# Patient Record
Sex: FEMALE | Race: ASIAN | Employment: STUDENT | URBAN - METROPOLITAN AREA
[De-identification: names, ages, dates, MRNs, and addresses within clinical notes are randomized per-mention and may not be internally consistent; named-entity substitution may affect disease eponyms.]

---
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
## 2018-11-29 ENCOUNTER — OFFICE VISIT (OUTPATIENT)
Dept: FAMILY MEDICINE | Facility: CLINIC | Age: 18
End: 2018-11-29
Payer: COMMERCIAL

## 2018-11-29 VITALS — TEMPERATURE: 99.1 F | DIASTOLIC BLOOD PRESSURE: 54 MMHG | SYSTOLIC BLOOD PRESSURE: 112 MMHG

## 2018-11-29 DIAGNOSIS — Z71.84 TRAVEL ADVICE ENCOUNTER: Primary | ICD-10-CM

## 2018-11-29 DIAGNOSIS — Z23 NEED FOR VACCINATION: ICD-10-CM

## 2018-11-29 PROCEDURE — 90472 IMMUNIZATION ADMIN EACH ADD: CPT | Mod: GA | Performed by: NURSE PRACTITIONER

## 2018-11-29 PROCEDURE — 90675 RABIES VACCINE IM: CPT | Mod: GA | Performed by: NURSE PRACTITIONER

## 2018-11-29 PROCEDURE — 90717 YELLOW FEVER VACCINE SUBQ: CPT | Mod: GA | Performed by: NURSE PRACTITIONER

## 2018-11-29 PROCEDURE — 99403 PREV MED CNSL INDIV APPRX 45: CPT | Mod: 25 | Performed by: NURSE PRACTITIONER

## 2018-11-29 PROCEDURE — 90715 TDAP VACCINE 7 YRS/> IM: CPT | Mod: GA | Performed by: NURSE PRACTITIONER

## 2018-11-29 PROCEDURE — 90471 IMMUNIZATION ADMIN: CPT | Mod: GA | Performed by: NURSE PRACTITIONER

## 2018-11-29 RX ORDER — ACETAZOLAMIDE 125 MG/1
TABLET ORAL
Qty: 15 TABLET | Refills: 0 | Status: SHIPPED | OUTPATIENT
Start: 2018-11-29 | End: 2019-08-10

## 2018-11-29 RX ORDER — AZITHROMYCIN 500 MG/1
500 TABLET, FILM COATED ORAL DAILY
Qty: 3 TABLET | Refills: 0 | Status: SHIPPED | OUTPATIENT
Start: 2018-11-29 | End: 2018-12-02

## 2018-11-29 ASSESSMENT — ANXIETY QUESTIONNAIRES
GAD7 TOTAL SCORE: 2
3. WORRYING TOO MUCH ABOUT DIFFERENT THINGS: NOT AT ALL
1. FEELING NERVOUS, ANXIOUS, OR ON EDGE: SEVERAL DAYS
IF YOU CHECKED OFF ANY PROBLEMS ON THIS QUESTIONNAIRE, HOW DIFFICULT HAVE THESE PROBLEMS MADE IT FOR YOU TO DO YOUR WORK, TAKE CARE OF THINGS AT HOME, OR GET ALONG WITH OTHER PEOPLE: SOMEWHAT DIFFICULT
2. NOT BEING ABLE TO STOP OR CONTROL WORRYING: NOT AT ALL
5. BEING SO RESTLESS THAT IT IS HARD TO SIT STILL: NOT AT ALL
7. FEELING AFRAID AS IF SOMETHING AWFUL MIGHT HAPPEN: SEVERAL DAYS
6. BECOMING EASILY ANNOYED OR IRRITABLE: NOT AT ALL

## 2018-11-29 ASSESSMENT — PATIENT HEALTH QUESTIONNAIRE - PHQ9
SUM OF ALL RESPONSES TO PHQ QUESTIONS 1-9: 4
5. POOR APPETITE OR OVEREATING: NOT AT ALL

## 2018-11-29 NOTE — PROGRESS NOTES
Nurse Note      Itinerary:  [facility] [place] [facility]      Departure Date: 01/23/2019      Return Date: 05/10/2019      Length of Trip 4 months      Reason for Travel: Study abroad           Urban or rural: both      Accommodations: Family home        IMMUNIZATION HISTORY  Have you received any immunizations within the past 4 weeks?  No  Have you ever fainted from having your blood drawn or from an injection?  No  Have you ever had a fever reaction to vaccination?  No  Have you ever had any bad reaction or side effect from any vaccination?  No  Have you ever had hepatitis A or B vaccine?  Yes  Do you live (or work closely) with anyone who has AIDS, an AIDS-like condition, any other immune disorder or who is on chemotherapy for cancer?  No  Do you have a family history of immunodeficiency?  No  Have you received any injection of immune globulin or any blood products during the past 12 months?  No    Patient roomed by MARGARITA Newton  Rola Nettles is a 18 year old female seen today alone for counsultation for international travel to [facility] [facility] [place] for Study abroad.  Patient will be departing in  2 month(s) and staying for   4 month(s) and  traveling group from [facility].      Patient itinerary :  will be in the urban region of  [facility] and possible travel to [place] for excursions which presents risk for Yellow Fever. exposure.      Patient's activities will include sightseeing, high altitude exposure and study abroad.    Patient[place] country [place] birth [place]    Special medical concerns: none  Pre-travel questionnaire was completed by patient and reviewed by provider.     Vitals: /54  Temp 99.1  F (37.3  C) (Tympanic)  BMI= There is no height or weight on file to calculate BMI.    EXAM:  General:  Well-nourished, well-developed in no acute distress.  Appears to be stated age, interacts appropriately and expresses understanding of information given to  patient.    Current Outpatient Prescriptions   Medication Sig Dispense Refill     levonorgestrel-ethinyl estradiol (AVIANE,ALESSE,LESSINA) 0.1-20 MG-MCG per tablet Take 1 tablet by mouth daily 84 tablet 3     Patient Active Problem List   Diagnosis     Contraception     CARDIOVASCULAR SCREENING; LDL GOAL LESS THAN 130     Allergies   Allergen Reactions     No Known Drug Allergies          Immunizations discussed include:   Hepatitis A:  Up to date  Hepatitis B: Up to date  Influenza: Up to date  Typhoid: Ordered/given today, risks, benefits and side effects reviewed  Rabies: Ordered/given today, risks, benefits and side effects reviewed  Yellow Fever: Stamaril Ordered/given today - consent completed, side effects, precautions, allergies, risks discussed. Patient expressed understanding.  Greenlandic Encephalitis: Not indicated  Meningococcus: Up to date  Tetanus/Diphtheria: Ordered/given today, risks, benefits and side effects reviewed  Measles/Mumps/Rubella: Up to date  Cholera: Not needed  Polio: Up to date  Pneumococcal: Under age of 65  Varicella: Up to date  Zostavax:  Not indicated  Shingrix: Not indicated  HPV:  Up to date  TB:  Low risk     Stamaril Informed Consent    The patient was provided with a copy of the IRB-approved consent form and all questions were answered before the patient agreed to participate by signing the informed consent document.   A copy of the form was provided to the patient.    Date: November 29, 2018   Consent Version Date: 05/10/2018  Consent Obtained by:  Malathi Quiroz CNP (Lori)     HIPAA:  Yes  HIPAA Authorization Signed Date: November 29, 2018       Inclusion/Exclusion Criteria:    (Similar to Yellow Fever-VAX)      The patient met all of the following inclusion criteria in order to be eligible for the Stamaril vaccination under this EAP (Expanded Access Investigational New Drug Program)           At increased risk for YF, including researchers, laboratory workers, vaccine  production staff, and those who are traveling within 30 days to a YF-endemic region or to a country requiring proof of YF vaccination under IHRs (International Health Regulations)?       Yes     Patient is greater than or equal to 9 months of age on the day of vaccination?     Yes     Patient is greater than or equal to 18 years of age and signed and dated the Consent Forms?     Yes     Patient is < 18 years of age and parent(s)/guardian(s) signed and dated the Consent Forms?      Patient is 7 years to < 18 years of age and signed and dated the Assent form?        No Assent is required.  Patient is <7 years of age.     No      No      N/A     The patient did not meet any of the following criteria that would have excluded the patient from receiving the Stamaril vaccination under this EAP              Patient is less than 9 months of age.       No     The patient is breast-feeding and cannot stop nursing for at least 14 days after vaccination.    Note: Yellow Fever vaccine virus may be transmissible via breast milk by nursing mothers who are vaccinated during the final 2 weeks of pregnancy or post-partum.   Following transmission, infants may develop encephalitis.  The minimum time of discontinuation of breastfeeding for 14 days after vaccination is based on the expected clearance of live-attenuated vaccine virus.       No     The patient is immunosuppressed, whether congenital or idiopathic, including for example, leukemia, lymphoma, other malignancies, and patients who are receiving immunosuppressant medications (e.g. Systemic corticosteroids [greater than the standard dose of topical or inhaled steroids], alkylating drugs, antimetabolites, of other cytotoxic or immunomodulatory drugs) or radiation therapy or organ transplantation.       No     The patient has known hypersensitivity to the active substance or to any of the excipients of Stamaril vaccine or to eggs or chicken proteins.     No     The patient is  symptomatic for human immunodeficiency virus (HIV) infection     No     The patient is asymptomatic for HIV infection but accompanied by evidence of severe immune suppression    Note:  Evidence of severe immune suppression includes CD4+ T-cell counts < 200 cubic millimeters (or < 15% total lymphocytes in children aged < 6 years), or as determined by the health care provider.       No     The patient has a history of thymus dysfunction (including myasthenia gravis, thymoma, thymectomy)     No     Moderate or severe febrile illness or acute illness    Note: Participation in the EAP can be reassessed when moderate or severe febrile illness or acute illness has resolved.       No         Altitude Exposure on this trip: yes  ( [place])   Past tolerance to Altitude: na    ASSESSMENT/PLAN:  No diagnosis found.  I have reviewed general recommendations for safe travel   including: food/water precautions, insect precautions, safer sex   practices given high prevalence of Zika, HIV and other STDs,   roadway safety. Educational materials and Travax report provided.    Malaraia prophylaxis recommended: none  Symptomatic treatment for traveler's diarrhea: azithromycin  Altitude illness prevention and treatment: Diamox prescription given with instructions on use and education provided on Acute altitude illness recognition and prevention.    PHQ:  4  BRAXTON:  2      Evacuation insurance advised and resources were provided to patient.    Total visit time 45 minutes  with over 50% of time spent counseling patient as detailed above.    Malathi Quiroz CNP

## 2018-11-29 NOTE — MR AVS SNAPSHOT
After Visit Summary   11/29/2018    Rola Nettles    MRN: [id number]           Patient Information     Date Of Birth          2000        Visit Information        Provider Department      11/29/2018 10:30 AM Malathi Quiroz APRN CNP [facility]        Today's Diagnoses     Travel advice encounter    -  1    Need for vaccination          Care Instructions    Today November 29, 2018 you received the    Rabies Vaccine - Please return on 12/6/18 for your 2nd dose and 12/20/2018 for your 3rd and final dose.    Yellow Fever (YF)    Tetanus (Tdap) Vaccine      12/06/2018  Rabiesm and Typhoid    12/20/2018: Rabies      .    These appointments can be made as a NURSE ONLY visit.    **It is very important for the vaccinations to be given on the scheduled day(s), this helps ensure you receive the full effectiveness of the vaccine.**    Please call [facility] with any questions [phone]    Thank you for visiting [facility]              Follow-ups after your visit        Future tests that were ordered for you today     Open Standing Orders        Priority Remaining Interval Expires Ordered    C IMOVAX RABIES VACCINE, IM Routine 2/3  12/29/2018 11/29/2018          Open Future Orders        Priority Expected Expires Ordered    TYPHOID VACCINE, IM Routine 12/6/2018 11/29/2019 11/29/2018            Who to contact     If you have questions or need follow up information about today's clinic visit or your schedule please contact [facility] directly at [phone].  Normal or non-critical lab and imaging results will be communicated to you by MyChart, letter or phone within 4 business days after the clinic has received the results. If you do not hear from us within 7 days, please contact the clinic through MyChart or phone. If you have a critical or abnormal lab result, we will notify you by phone as soon as possible.  Submit refill requests  through [employer] or call your pharmacy and they will forward the refill request to us. Please allow 3 business days for your refill to be completed.          Additional Information About Your Visit        TrafficCasthart Information     [employer] gives you secure access to your electronic health record. If you see a primary care provider, you can also send messages to your care team and make appointments. If you have questions, please call your primary care clinic.  If you do not have a primary care provider, please call [phone] and they will assist you.        Care EveryWhere ID     This is your Care EveryWhere ID. This could be used by other organizations to access your [facility] medical records  [id number]        Your Vitals Were     Temperature Last Period                99.1  F (37.3  C) (Tympanic) 11/02/2018           Blood Pressure from Last 3 Encounters:   11/29/18 112/54   12/26/17 90/50   01/25/17 90/54    Weight from Last 3 Encounters:   12/26/17 121 lb (54.9 kg) (36 %)*   01/25/17 119 lb (54 kg) (36 %)*   04/05/04 42 lb 8 oz (19.3 kg) (36 %)*     * Growth percentiles are based on CDC 2-20 Years data.              We Performed the Following     C YELLOW FEVER IMMUNIZATION, LIVE, SQ (STAMARIL)     TDAP, IM (10 - 64 YRS) - Adacel          Today's Medication Changes          These changes are accurate as of 11/29/18 11:54 AM.  If you have any questions, ask your nurse or doctor.               Start taking these medicines.        Dose/Directions    acetaZOLAMIDE 125 MG tablet   Commonly known as:  DIAMOX   Used for:  Travel advice encounter   Started by:  Malathi Quiroz APRN CNP        Take one tab every 12 hours starting 24 hours prior to ascent and continue for 24 hours while at the highest altitude   Quantity:  15 tablet   Refills:  0       azithromycin 500 MG tablet   Commonly known as:  ZITHROMAX   Used for:  Travel advice encounter   Started by:  Malathi Quiroz APRN CNP        Dose:  500 mg   Take 1  tablet (500 mg) by mouth daily for 3 doses Take 1 tablet a day for up to 3 days for severe diarrhea   Quantity:  3 tablet   Refills:  0            Where to get your medicines      These medications were sent to [facility]/pharmacy #[id number] - [place], [place] - [address]  [address] [address], [facility] [address]    Hours:  [facility] converted to [facility] Phone:  [phone]     acetaZOLAMIDE 125 MG tablet    azithromycin 500 MG tablet                Primary Care Provider Office Phone # Fax #    Ericka Childs Shayna, ANTONY [facility] [phone]       [address] [facility] [address]        Equal Access to Services     [facility] AH: Hadii aad magaly hadasho Sokeshawn, waaxda luqadaha, qaybta kaalmada adepaulineyada, alex norman . So [facility] [phone].    ATENCIÓN: Si habla español, tiene a kirk disposición servicios gratuitos de asistencia lingüística. [facility] [phone].    We comply with applicable federal civil rights laws and [place] laws. We do not discriminate on the basis of race, color, national origin, age, disability, sex, sexual orientation, or gender identity.            Thank you!     Thank you for choosing [facility]  for your care. Our goal is always to provide you with excellent care. Hearing back from our patients is one way we can continue to improve our services. Please take a few minutes to complete the written survey that you may receive in the mail after your visit with us. Thank you!             Your Updated Medication List - Protect others around you: Learn how to safely use, store and throw away your medicines at www.disposemymeds.org.          This list is accurate as of 11/29/18 11:54 AM.  Always use your most recent med list.                   Brand Name Dispense Instructions for use Diagnosis    acetaZOLAMIDE 125 MG tablet    DIAMOX    15 tablet    Take one tab every 12 hours starting 24 hours prior to ascent and continue for 24 hours while at the  highest altitude    Travel advice encounter       azithromycin 500 MG tablet    ZITHROMAX    3 tablet    Take 1 tablet (500 mg) by mouth daily for 3 doses Take 1 tablet a day for up to 3 days for severe diarrhea    Travel advice encounter       levonorgestrel-ethinyl estradiol 0.1-20 MG-MCG tablet    AVIANE/ALESSE/LESSINA    84 tablet    Take 1 tablet by mouth daily    Encounter for surveillance of contraceptive pills

## 2018-11-29 NOTE — PATIENT INSTRUCTIONS
Today November 29, 2018 you received the    Rabies Vaccine - Please return on 12/6/18 for your 2nd dose and 12/20/2018 for your 3rd and final dose.    Yellow Fever (YF)    Tetanus (Tdap) Vaccine      12/06/2018  Rabiesm and Typhoid    12/20/2018: Rabies      .    These appointments can be made as a NURSE ONLY visit.    **It is very important for the vaccinations to be given on the scheduled day(s), this helps ensure you receive the full effectiveness of the vaccine.**    Please call [facility] with any questions [phone]    Thank you for visiting [facility]

## 2018-11-29 NOTE — NURSING NOTE
"Chief Complaint   Patient presents with     Travel Clinic     /54  Temp 99.1  F (37.3  C) (Tympanic)  LMP 11/02/2018 Estimated body mass index is 22.13 kg/(m^2) as calculated from the following:    Height as of 12/26/17: 5' 2\" (1.575 m).    Weight as of 12/26/17: 121 lb (54.9 kg).        Health Maintenance due pending provider review:  NONE    n/a    Alix Vásquez CMA  "

## 2018-11-30 ASSESSMENT — ANXIETY QUESTIONNAIRES: GAD7 TOTAL SCORE: 2

## 2018-12-06 ENCOUNTER — ALLIED HEALTH/NURSE VISIT (OUTPATIENT)
Dept: NURSING | Facility: CLINIC | Age: 18
End: 2018-12-06
Payer: COMMERCIAL

## 2018-12-06 DIAGNOSIS — Z71.84 TRAVEL ADVICE ENCOUNTER: ICD-10-CM

## 2018-12-06 DIAGNOSIS — Z23 NEED FOR VACCINATION: ICD-10-CM

## 2018-12-06 PROCEDURE — 90691 TYPHOID VACCINE IM: CPT | Mod: GA

## 2018-12-06 PROCEDURE — 90472 IMMUNIZATION ADMIN EACH ADD: CPT | Mod: GA

## 2018-12-06 PROCEDURE — 90675 RABIES VACCINE IM: CPT | Mod: GA

## 2018-12-06 PROCEDURE — 99207 ZZC NO CHARGE NURSE ONLY: CPT

## 2018-12-06 PROCEDURE — 90471 IMMUNIZATION ADMIN: CPT | Mod: GA

## 2018-12-06 NOTE — PROGRESS NOTES
Screening Questionnaire for Adult Immunization    Are you sick today?   No   Do you have allergies to medications, food, a vaccine component or latex?   No   Have you ever had a serious reaction after receiving a vaccination?   No   Do you have a long-term health problem with heart disease, lung disease, asthma, kidney disease, metabolic disease (e.g. diabetes), anemia, or other blood disorder?   No   Do you have cancer, leukemia, HIV/AIDS, or any other immune system problem?   No   In the past 3 months, have you taken medications that affect  your immune system, such as prednisone, other steroids, or anticancer drugs; drugs for the treatment of rheumatoid arthritis, Crohn s disease, or psoriasis; or have you had radiation treatments?   No   Have you had a seizure, or a brain or other nervous system problem?   No   During the past year, have you received a transfusion of blood or blood     products, or been given immune (gamma) globulin or antiviral drug?   No   For women: Are you pregnant or is there a chance you could become        pregnant during the next month?   No   Have you received any vaccinations in the past 4 weeks?   Yes-travel vaccines     Immunization questionnaire was positive for at least one answer. Bryan Quiroz CNP aware  Per orders of  Malathi Quiroz CNP, injection of Rabies #2 and Typhoid given by Mary Chakraborty CMA. Patient instructed to remain in clinic for 15 minutes afterwards, and to report any adverse reaction to me immediately.       Screening performed by patient on 12/6/2018 at 10:34 AM.

## 2018-12-06 NOTE — MR AVS SNAPSHOT
After Visit Summary   12/6/2018    Rola Nettles    MRN: [id number]           Patient Information     Date Of Birth          2000        Visit Information        Provider Department      12/6/2018 10:30 AM UP NURSE [facility]        Today's Diagnoses     Need for vaccination        Travel advice encounter           Follow-ups after your visit        Your next 10 appointments already scheduled     Dec 20, 2018 10:30 AM CST   Nurse Only with UP NURSE   [facility] ([facility])    [address] [facility] [address] [phone]              Who to contact     If you have questions or need follow up information about today's clinic visit or your schedule please contact [facility] directly at [phone].  Normal or non-critical lab and imaging results will be communicated to you by MyChart, letter or phone within 4 business days after the clinic has received the results. If you do not hear from us within 7 days, please contact the clinic through [employer] or phone. If you have a critical or abnormal lab result, we will notify you by phone as soon as possible.  Submit refill requests through [employer] or call your pharmacy and they will forward the refill request to us. Please allow 3 business days for your refill to be completed.          Additional Information About Your Visit        MyChart Information     [employer] gives you secure access to your electronic health record. If you see a primary care provider, you can also send messages to your care team and make appointments. If you have questions, please call your primary care clinic.  If you do not have a primary care provider, please call [phone] and they will assist you.        Care EveryWhere ID     This is your Care EveryWhere ID. This could be used by other organizations to access your [facility] medical records  [id number]         Blood Pressure from Last 3 Encounters:   11/29/18  112/54   12/26/17 90/50   01/25/17 90/54    Weight from Last 3 Encounters:   12/26/17 121 lb (54.9 kg) (36 %)*   01/25/17 119 lb (54 kg) (36 %)*   04/05/04 42 lb 8 oz (19.3 kg) (36 %)*     * Growth percentiles are based on [facility] 2-20 Years data.              We Performed the Following     C IMOVAX RABIES VACCINE, IM     TYPHOID VACCINE, IM        Primary Care Provider Office Phone # Fax #    Ericka Childs Shayna, APRN [facility] [phone]       [address] [facility] [address]        Equal Access to Services     NOE HERNANDEZ : Hadii simona Jarquin, wasantada fiona, qaybta kaalmada hudson, alex norman . So [facility] [phone].    ATENCIÓN: Si habla español, tiene a kirk disposición servicios gratuitos de asistencia lingüística. Llame al [phone].    We comply with applicable federal civil rights laws and [place] laws. We do not discriminate on the basis of race, color, national origin, age, disability, sex, sexual orientation, or gender identity.            Thank you!     Thank you for choosing [facility]  for your care. Our goal is always to provide you with excellent care. Hearing back from our patients is one way we can continue to improve our services. Please take a few minutes to complete the written survey that you may receive in the mail after your visit with us. Thank you!             Your Updated Medication List - Protect others around you: Learn how to safely use, store and throw away your medicines at www.disposemymeds.org.          This list is accurate as of 12/6/18 10:54 AM.  Always use your most recent med list.                   Brand Name Dispense Instructions for use Diagnosis    acetaZOLAMIDE 125 MG tablet    DIAMOX    15 tablet    Take one tab every 12 hours starting 24 hours prior to ascent and continue for 24 hours while at the highest altitude    Travel advice encounter       levonorgestrel-ethinyl estradiol 0.1-20 MG-MCG tablet     NAVNEET/ELLE/WANDA    84 tablet    Take 1 tablet by mouth daily    Encounter for surveillance of contraceptive pills

## 2018-12-20 ENCOUNTER — ALLIED HEALTH/NURSE VISIT (OUTPATIENT)
Dept: NURSING | Facility: CLINIC | Age: 18
End: 2018-12-20
Payer: COMMERCIAL

## 2018-12-20 DIAGNOSIS — Z23 NEED FOR VACCINATION: ICD-10-CM

## 2018-12-20 DIAGNOSIS — Z71.84 TRAVEL ADVICE ENCOUNTER: ICD-10-CM

## 2018-12-20 PROCEDURE — 90471 IMMUNIZATION ADMIN: CPT | Mod: GA

## 2018-12-20 PROCEDURE — 99207 ZZC NO CHARGE NURSE ONLY: CPT

## 2018-12-20 PROCEDURE — 90675 RABIES VACCINE IM: CPT | Mod: GA

## 2018-12-20 NOTE — NURSING NOTE
Chief Complaint   Patient presents with     Imm/Inj     Rabies #3     Screening Questionnaire for Adult Immunization    Are you sick today?   No   Do you have allergies to medications, food, a vaccine component or latex?   No   Have you ever had a serious reaction after receiving a vaccination?   No   Do you have a long-term health problem with heart disease, lung disease, asthma, kidney disease, metabolic disease (e.g. diabetes), anemia, or other blood disorder?   No   Do you have cancer, leukemia, HIV/AIDS, or any other immune system problem?   No   In the past 3 months, have you taken medications that affect  your immune system, such as prednisone, other steroids, or anticancer drugs; drugs for the treatment of rheumatoid arthritis, Crohn s disease, or psoriasis; or have you had radiation treatments?   No   Have you had a seizure, or a brain or other nervous system problem?   No   During the past year, have you received a transfusion of blood or blood     products, or been given immune (gamma) globulin or antiviral drug?   No   For women: Are you pregnant or is there a chance you could become        pregnant during the next month?   No   Have you received any vaccinations in the past 4 weeks?   Yes     Immunization questionnaire was positive for at least one answer.  Notified RENETTA.        Per orders of Dr. JACKSON, injection of Rabies #3 given by Yeni Gates. Patient instructed to remain in clinic for 15 minutes afterwards, and to report any adverse reaction to me immediately.       Screening performed by Yeni Gates on 12/20/2018 at 12:02 PM.     FARZANA Nick

## 2018-12-28 DIAGNOSIS — Z30.41 ENCOUNTER FOR SURVEILLANCE OF CONTRACEPTIVE PILLS: ICD-10-CM

## 2018-12-28 NOTE — TELEPHONE ENCOUNTER
"Requested Prescriptions   Pending Prescriptions Disp Refills     levonorgestrel-ethinyl estradiol (AVIANE/ALESSE/LESSINA) 0.1-20 MG-MCG tablet [Pharmacy Med Name: L-NORGES/ETH ES TABS 28'S 0.1/20] 84 tablet 3    Last Written Prescription Date:  12/26/2017  Last Fill Quantity: 84,  # refills: 3   Last office visit: 12/26/2017 with prescribing provider:     Future Office Visit:   Next 5 appointments (look out 90 days)    Jan 09, 2019 10:00 AM CST  PHYSICAL with ANTONY Sanchez CNP  [facility] ([facility]) [address] [facility] [address] [phone]        Sig: TAKE 1 TABLET DAILY    Contraceptives Protocol Passed - 12/28/2018 12:18 AM       Passed - Patient is not a current smoker if age is 35 or older       Passed - Recent (12 mo) or future (30 days) visit within the authorizing provider's specialty    Patient had office visit in the last 12 months or has a visit in the next 30 days with authorizing provider or within the authorizing provider's specialty.  See \"Patient Info\" tab in inbasket, or \"Choose Columns\" in Meds & Orders section of the refill encounter.             Passed - No active pregnancy on record       Passed - No positive pregnancy test in past 12 months        "

## 2018-12-31 RX ORDER — LEVONORGESTREL/ETHIN.ESTRADIOL 0.1-0.02MG
TABLET ORAL
Qty: 28 TABLET | Refills: 0 | Status: SHIPPED | OUTPATIENT
Start: 2018-12-31 | End: 2019-01-16

## 2019-01-01 NOTE — TELEPHONE ENCOUNTER
Medication is being filled for 1 time refill only due to:  Patient needs to be seen because it has been more than one year since last visit.   Signed Prescriptions:                        Disp   Refills    levonorgestrel-ethinyl estradiol (AVIANE/A*28 tab*0        Sig: TAKE 1 TABLET DAILY  Authorizing Provider: MELANIE DAVILA  Ordering User: KAT BELTRAN      Routing to [place] Reception - one month given - patient due for annual office visit please    Thank you,  Kat Beltran RN

## 2019-01-16 ENCOUNTER — OFFICE VISIT (OUTPATIENT)
Dept: INTERNAL MEDICINE | Facility: CLINIC | Age: 19
End: 2019-01-16
Payer: COMMERCIAL

## 2019-01-16 VITALS
HEIGHT: 62 IN | DIASTOLIC BLOOD PRESSURE: 56 MMHG | WEIGHT: 121 LBS | RESPIRATION RATE: 12 BRPM | TEMPERATURE: 98.7 F | OXYGEN SATURATION: 98 % | HEART RATE: 56 BPM | SYSTOLIC BLOOD PRESSURE: 88 MMHG | BODY MASS INDEX: 22.26 KG/M2

## 2019-01-16 DIAGNOSIS — E78.00 ELEVATED CHOLESTEROL: ICD-10-CM

## 2019-01-16 DIAGNOSIS — Z30.41 ENCOUNTER FOR SURVEILLANCE OF CONTRACEPTIVE PILLS: ICD-10-CM

## 2019-01-16 DIAGNOSIS — Z00.01 ENCOUNTER FOR GENERAL ADULT MEDICAL EXAMINATION WITH ABNORMAL FINDINGS: Primary | ICD-10-CM

## 2019-01-16 PROCEDURE — 80061 LIPID PANEL: CPT | Performed by: NURSE PRACTITIONER

## 2019-01-16 PROCEDURE — 99395 PREV VISIT EST AGE 18-39: CPT | Performed by: NURSE PRACTITIONER

## 2019-01-16 PROCEDURE — 80053 COMPREHEN METABOLIC PANEL: CPT | Performed by: NURSE PRACTITIONER

## 2019-01-16 PROCEDURE — 36415 COLL VENOUS BLD VENIPUNCTURE: CPT | Performed by: NURSE PRACTITIONER

## 2019-01-16 RX ORDER — LEVONORGESTREL/ETHIN.ESTRADIOL 0.1-0.02MG
1 TABLET ORAL DAILY
Qty: 84 TABLET | Refills: 0 | Status: SHIPPED | OUTPATIENT
Start: 2019-01-16 | End: 2019-10-02

## 2019-01-16 RX ORDER — LEVONORGESTREL/ETHIN.ESTRADIOL 0.1-0.02MG
1 TABLET ORAL DAILY
Qty: 84 TABLET | Refills: 4 | Status: SHIPPED | OUTPATIENT
Start: 2019-04-10 | End: 2019-08-10

## 2019-01-16 ASSESSMENT — ENCOUNTER SYMPTOMS
HEMATOCHEZIA: 0
SHORTNESS OF BREATH: 0
NAUSEA: 0
DIARRHEA: 0
EYE PAIN: 0
DYSURIA: 0
DIZZINESS: 0
PALPITATIONS: 0
FREQUENCY: 0
ABDOMINAL PAIN: 0
NERVOUS/ANXIOUS: 0
ARTHRALGIAS: 0
SORE THROAT: 1
COUGH: 0
HEADACHES: 0
CONSTIPATION: 0
FEVER: 0
HEMATURIA: 0
BREAST MASS: 0
MYALGIAS: 0
WEAKNESS: 0
PARESTHESIAS: 0
JOINT SWELLING: 0
HEARTBURN: 0

## 2019-01-16 ASSESSMENT — MIFFLIN-ST. JEOR: SCORE: 1272.1

## 2019-01-16 NOTE — PROGRESS NOTES
SUBJECTIVE:   CC: Rola Nettles is an 18 year old woman who presents for preventive health visit.     Physical   Annual:     Getting at least 3 servings of Calcium per day:  NO    Bi-annual eye exam:  Yes    Dental care twice a year:  Yes    Sleep apnea or symptoms of sleep apnea:  Daytime drowsiness    Diet:  Regular (no restrictions)    Frequency of exercise:  1 day/week    Duration of exercise:  30-45 minutes    Taking medications regularly:  Yes    Medication side effects:  None    Additional concerns today:  Yes    PHQ-2 Total Score: 2      Going to [facility] for study abroad  Has diamox in case of travel to higher [facility]  Also has zithromax in case of sickness     Needs to have BCP for travel   Also for express scripts for the year         Today's PHQ-2 Score:   PHQ-2 ( 1999 Pfizer) 1/16/2019   Q1: Little interest or pleasure in doing things 1   Q2: Feeling down, depressed or hopeless 1   PHQ-2 Score 2   Q1: Little interest or pleasure in doing things Several days   Q2: Feeling down, depressed or hopeless Several days   PHQ-2 Score 2       Abuse: Current or Past(Physical, Sexual or Emotional)- No  Do you feel safe in your environment? Yes    Social History     Tobacco Use     Smoking status: Never Smoker     Smokeless tobacco: Never Used   Substance Use Topics     Alcohol use: Yes     Comment: Occasionally     Alcohol Use 1/16/2019   If you drink alcohol do you typically have greater than 3 drinks per day OR greater than 7 drinks per week? No       Reviewed orders with patient.  Reviewed health maintenance and updated orders accordingly - Yes          Pertinent mammograms are reviewed under the imaging tab.  History of abnormal Pap smear: NO - under age 21, PAP not appropriate for age     Reviewed and updated as needed this visit by clinical staff  Tobacco  Allergies  Meds         Reviewed and updated as needed this visit by Provider  Allergies            Review of Systems   Constitutional: Negative  "for fever.   HENT: Positive for sore throat. Negative for congestion, ear pain and hearing loss.    Eyes: Negative for pain and visual disturbance.   Respiratory: Negative for cough and shortness of breath.    Cardiovascular: Negative for chest pain, palpitations and peripheral edema.   Gastrointestinal: Negative for abdominal pain, constipation, diarrhea, heartburn, hematochezia and nausea.   Breasts:  Negative for tenderness, breast mass and discharge.   Genitourinary: Negative for dysuria, frequency, genital sores, hematuria, pelvic pain, urgency, vaginal bleeding and vaginal discharge.   Musculoskeletal: Negative for arthralgias, joint swelling and myalgias.   Skin: Negative for rash.   Neurological: Negative for dizziness, weakness, headaches and paresthesias.   Psychiatric/Behavioral: Negative for mood changes. The patient is not nervous/anxious.           OBJECTIVE:   BP (!) 88/56   Pulse 56   Temp 98.7  F (37.1  C) (Oral)   Resp 12   Ht 1.575 m (5' 2\")   Wt 54.9 kg (121 lb)   LMP 12/28/2018   SpO2 98%   BMI 22.13 kg/m    Physical Exam  GENERAL: alert and no distress  EYES: Eyes grossly normal to inspection,  and conjunctivae and sclerae normal  HENT: ear canals and TM's normal, nose and mouth without ulcers or lesions  NECK: no adenopathy, no asymmetry, masses, or scars and thyroid normal to palpation  RESP: lungs clear to auscultation - no rales, rhonchi or wheezes  CV: regular rate and rhythm, normal S1 S2, no S3 or S4, no murmur, click or rub, no peripheral edema and peripheral pulses strong  ABDOMEN: soft, nontender, no hepatosplenomegaly, no masses and bowel sounds normal  MS: no gross musculoskeletal defects noted, no edema  SKIN: no suspicious lesions or rashes  NEURO: Normal strength and tone, mentation intact and speech normal  PSYCH: mentation appears normal, affect normal/bright    Diagnostic Test Results:  Lab   Declined STD     ASSESSMENT/PLAN:   1. Encounter for general adult medical " "examination with abnormal findings    - Lipid panel reflex to direct LDL Fasting  - Comprehensive metabolic panel    2. Encounter for surveillance of contraceptive pills    - levonorgestrel-ethinyl estradiol (AVIANE/ALESSE/LESSINA) 0.1-20 MG-MCG tablet; Take 1 tablet by mouth daily  Dispense: 84 tablet; Refill: 4  - levonorgestrel-ethinyl estradiol (AVIANE/ALESSE/LESSINA) 0.1-20 MG-MCG tablet; Take 1 tablet by mouth daily  Dispense: 84 tablet; Refill: 0    3. Elevated cholesterol    - Lipid panel reflex to direct LDL Fasting  - Comprehensive metabolic panel    COUNSELING:  Reviewed preventive health counseling, as reflected in patient instructions       Regular exercise       Healthy diet/nutrition       Contraception       Osteoporosis Prevention/Bone Health    BP Readings from Last 1 Encounters:   01/16/19 (!) 88/56     Estimated body mass index is 22.13 kg/m  as calculated from the following:    Height as of this encounter: 1.575 m (5' 2\").    Weight as of this encounter: 54.9 kg (121 lb).           reports that  has never smoked. she has never used smokeless tobacco.      Counseling Resources:  ATP IV Guidelines  Pooled Cohorts Equation Calculator  Breast Cancer Risk Calculator  FRAX Risk Assessment  ICSI Preventive Guidelines  Dietary Guidelines for Americans, 2010  USDA's MyPlate  ASA Prophylaxis  Lung CA Screening    ANTONY Sanchez [facility]  "

## 2019-01-17 LAB
ALBUMIN SERPL-MCNC: 4.2 G/DL (ref 3.4–5)
ALP SERPL-CCNC: 40 U/L (ref 40–150)
ALT SERPL W P-5'-P-CCNC: 14 U/L (ref 0–50)
ANION GAP SERPL CALCULATED.3IONS-SCNC: 6 MMOL/L (ref 3–14)
AST SERPL W P-5'-P-CCNC: 13 U/L (ref 0–45)
BILIRUB SERPL-MCNC: 0.7 MG/DL (ref 0.2–1.3)
BUN SERPL-MCNC: 11 MG/DL (ref 7–30)
CALCIUM SERPL-MCNC: 8.8 MG/DL (ref 8.5–10.1)
CHLORIDE SERPL-SCNC: 106 MMOL/L (ref 94–109)
CHOLEST SERPL-MCNC: 215 MG/DL
CO2 SERPL-SCNC: 25 MMOL/L (ref 20–32)
CREAT SERPL-MCNC: 0.63 MG/DL (ref 0.52–1.04)
GFR SERPL CREATININE-BSD FRML MDRD: >90 ML/MIN/{1.73_M2}
GLUCOSE SERPL-MCNC: 86 MG/DL (ref 70–99)
HDLC SERPL-MCNC: 60 MG/DL
LDLC SERPL CALC-MCNC: 139 MG/DL
NONHDLC SERPL-MCNC: 155 MG/DL
POTASSIUM SERPL-SCNC: 4.2 MMOL/L (ref 3.4–5.3)
PROT SERPL-MCNC: 7.2 G/DL (ref 6.8–8.8)
SODIUM SERPL-SCNC: 137 MMOL/L (ref 133–144)
TRIGL SERPL-MCNC: 82 MG/DL

## 2019-02-11 ENCOUNTER — TELEPHONE (OUTPATIENT)
Dept: INTERNAL MEDICINE | Facility: CLINIC | Age: 19
End: 2019-02-11

## 2019-02-11 NOTE — TELEPHONE ENCOUNTER
Panel Management Review      Patient has the following on her problem list: None      Composite cancer screening  Chart review shows that this patient is due/due soon for the following GC screening  Summary:    Patient is due/failing the following:   GC screening    Action needed:   Will address at next office visit    Type of outreach:    None    Questions for provider review:    None                                                                                                                                      KEREN Sierra       Chart routed to  .

## 2019-04-13 DIAGNOSIS — Z30.41 ENCOUNTER FOR SURVEILLANCE OF CONTRACEPTIVE PILLS: ICD-10-CM

## 2019-04-15 RX ORDER — LEVONORGESTREL AND ETHINYL ESTRADIOL 0.1-0.02MG
KIT ORAL
Qty: 84 TABLET | Refills: 0 | OUTPATIENT
Start: 2019-04-15

## 2019-04-15 NOTE — TELEPHONE ENCOUNTER
"Requested Prescriptions   Pending Prescriptions Disp Refills     SRONYX 0.1-20 MG-MCG tablet [Pharmacy Med Name: SRONYX 0.10-0.02 MG TABLET] 84 tablet 0     Sig: TAKE 1 TABLET BY MOUTH EVERY DAY   Last Written Prescription Date:  01/16/2019  Last Fill Quantity: 84,  # refills: 0   Last office visit: 1/16/2019 with prescribing provider:     Future Office Visit:      Contraceptives Protocol Passed - 4/13/2019  8:40 AM        Passed - Patient is not a current smoker if age is 35 or older        Passed - Recent (12 mo) or future (30 days) visit within the authorizing provider's specialty     Patient had office visit in the last 12 months or has a visit in the next 30 days with authorizing provider or within the authorizing provider's specialty.  See \"Patient Info\" tab in inbasket, or \"Choose Columns\" in Meds & Orders section of the refill encounter.              Passed - Medication is active on med list        Passed - No active pregnancy on record        Passed - No positive pregnancy test in past 12 months        "

## 2019-08-09 ENCOUNTER — ANCILLARY PROCEDURE (OUTPATIENT)
Dept: GENERAL RADIOLOGY | Facility: CLINIC | Age: 19
End: 2019-08-09
Attending: FAMILY MEDICINE

## 2019-08-09 ENCOUNTER — OFFICE VISIT (OUTPATIENT)
Dept: ORTHOPEDICS | Facility: CLINIC | Age: 19
End: 2019-08-09

## 2019-08-09 VITALS
HEIGHT: 62 IN | DIASTOLIC BLOOD PRESSURE: 76 MMHG | HEART RATE: 61 BPM | SYSTOLIC BLOOD PRESSURE: 115 MMHG | BODY MASS INDEX: 22.26 KG/M2 | WEIGHT: 121 LBS

## 2019-08-09 DIAGNOSIS — M84.362A STRESS FRACTURE OF LEFT TIBIA, INITIAL ENCOUNTER: Primary | ICD-10-CM

## 2019-08-09 DIAGNOSIS — M84.361A STRESS FRACTURE OF RIGHT TIBIA, INITIAL ENCOUNTER: ICD-10-CM

## 2019-08-09 DIAGNOSIS — M79.662 PAIN IN LEFT SHIN: ICD-10-CM

## 2019-08-09 ASSESSMENT — MIFFLIN-ST. JEOR: SCORE: 1259.16

## 2019-08-09 NOTE — PROGRESS NOTES
SPORTS & ORTHOPEDIC WALK-IN VISIT 8/9/2019    Primary Care Physician: Dr. Corral    7/9/19 - started noticing calf and shin pain. Worse on the left.  Shin pain is even bilaterally, calf pain is worst on left side.    Has half marathon on 8/24 - concerned she has over-trained.    Reason for visit:     What part of your body is injured / painful?  bilateral shin/calf    What caused the injury /pain? Recreational/competitive sports injury - running    How long ago did your injury occur or pain begin? about a month ago    What are your most bothersome symptoms? Pain    How would you characterize your symptom?  Aching shin - calf is sharp    What makes your symptoms better? Rest and Other: stretching    What makes your symptoms worse? Other: running    Have you been previously seen for this problem? No    Medical History:    Any recent changes to your medical history? No    Any new medication prescribed since last visit? No    Have you had surgery on this body part before? No    Social History:    Occupation: student - accounting    Handedness: Left    Exercise: running, core/lift    Review of Systems:    Do you have fever, chills, weight loss? No    Do you have any vision problems? No    Do you have any chest pain or edema? No    Do you have any shortness of breath or wheezing?  No    Do you have stomach problems? No    Do you have any numbness or focal weakness? No    Do you have diabetes? No    Do you have problems with bleeding or clotting? No    Do you have an rashes or other skin lesions? No       S:  Here today with shin and calf pain  Started training 12 weeks ago, initially with different shoes.  Switched to an old pair of running shoes and things improved.  When she got to her longer runs (8 miles) about 1 month ago she noted more shin pain bilaterally.  This has gotten worse over the last month.    Longest run has been 9 miles.  Training over the last week: Friday- rest, Saturday - 4 miles on tread  "(needed to stretch after every mile), Sunday and Monday- no running due to pain, Tuesday - 5 miles, and has not run since.    Does have pain with just walking, and hurts with stairs. Hurts initially when she started running, eventually pain subsides wit warming up, but has to stop a lot of stretch throughout her runs.  Shins continue to ache up to days after.    New shoes about 2 weeks ago, but no changes in her pain.  Has been at least 1 month since she has not has pain in her shins on a daily basis.  Sleeping is okay.    Never had shin splints in the past.  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  Believes she had a stress fracture in one of her feet.      Has not increased her nutritional needs with her increased running.    Golden milk (1 cup a day), couple slices of cheese a few times a week, and 1 cup of yogurt daily  Does not drink cows milk  Does not take any supplements    O: /76   Pulse 61   Ht 1.562 m (5' 1.5\")   Wt 54.9 kg (121 lb)   BMI 22.49 kg/m    GEN: pleasant, cooperative, NAD, well nourished  RIGHT LEG: No obvious deformity or swelling.  Exquisite tenderness palpation along the distal third of the posterior tibia.  No pain with heel jarring.  Full range of motion at knee and ankle.  No calf pain.  Normal dorsiflexion, but does improve significantly with knee flexion.  No tenderness to palpation over the Achilles tendon.  Pain with single leg hop.  LEFT LEG:  No obvious deformity or swelling.  Exquisite tenderness palpation along the distal third of the posterior tibia.  No pain with heel jarring.  Full range of motion at knee and ankle.  No calf pain.  Normal dorsiflexion, but does improve significantly with knee flexion.  No tenderness to palpation over the Achilles tendon.  Pain with single leg hop.    A/P:  Rola Nettles is a 19-year-old female who has been training for half marathon, and has subsequently developed shin splints consistent with likely a bony stress injury in her " bilateral tibias.  She has not been increasing her nutrition, and with a quick discussion about her diet it does not sound as though she is having adequate amounts of calcium.  X-rays were obtained and interpreted today did not demonstrate a fracture line, or other obvious injury.  Discussed these findings with the patient.    -Recommended discontinuing any running or pounding activities for at least the next 3 weeks as she has daily pain with walking and using stairs.  Discussed that she will likely not be able to participate in her half marathon.  She verbalized understanding, and does not want any significant injury, so is willing to stop running.  Discussed that she could bike or swim as an alternative for cardio exercise.  -Discussed the use of compression stockings if that were to be helpful with some of her pain in her shins.  Talked about walking boots or a tall Aircast, but patient declined these today.  -Discussed adequate calcium intake in her diet, recommending at least 3-4 servings of calcium a day.  Discussed trying to increase Jaelyn milk, cheese or yogurt.  Discussed if she were to have difficulties getting approximately 1200 mg of calcium a day she should use a supplement.  Discussed it is more important to get calcium from her diet.  If she were to take a supplement, she should take this in divided doses to help with absorption.  -Discussed avoidance of NSAIDs at this time to avoid any slowing of her healing time.  Recommended Tylenol for pain control if needed  -We will have her follow-up in 3 weeks in the sports medicine clinic  -Did discuss MRIs to have a definitive grading of her bone stress injury.  Patient would like to talk to her parents about this, and see how the next couple weeks ago with avoiding any pounding or running activities.  If she follows up and continues to have pain, would recommend obtaining MRI to evaluate    Patient seen and discussed with Dr. Dickson.    Sunita Barton,  DO  Primary Care Sports Medicine Fellow

## 2019-08-09 NOTE — LETTER
8/9/2019       RE: Rola Nettles  [address]  [facility] [address]     Dear Colleague,    Thank you for referring your patient, Rola Nettles, to the [facility] SPORTS AND ORTHOPAEDIC WALK IN CLINIC at [facility]. Please see a copy of my visit note below.          SPORTS & ORTHOPEDIC WALK-IN VISIT 8/9/2019    Primary Care Physician: Dr. Corral    7/9/19 - started noticing calf and shin pain. Worse on the left.  Shin pain is even bilaterally, calf pain is worst on left side.    Has half marathon on 8/24 - concerned she has over-trained.    Reason for visit:     What part of your body is injured / painful?  bilateral shin/calf    What caused the injury /pain? Recreational/competitive sports injury - running    How long ago did your injury occur or pain begin? about a month ago    What are your most bothersome symptoms? Pain    How would you characterize your symptom?  Aching shin - calf is sharp    What makes your symptoms better? Rest and Other: stretching    What makes your symptoms worse? Other: running    Have you been previously seen for this problem? No    Medical History:    Any recent changes to your medical history? No    Any new medication prescribed since last visit? No    Have you had surgery on this body part before? No    Social History:    Occupation: student - accounting    Handedness: Left    Exercise: running, core/lift    Review of Systems:    Do you have fever, chills, weight loss? No    Do you have any vision problems? No    Do you have any chest pain or edema? No    Do you have any shortness of breath or wheezing?  No    Do you have stomach problems? No    Do you have any numbness or focal weakness? No    Do you have diabetes? No    Do you have problems with bleeding or clotting? No    Do you have an rashes or other skin lesions? No       S:  Here today with shin and calf pain  Started training 12 weeks ago, initially with different shoes.  Switched to  "an old pair of running shoes and things improved.  When she got to her longer runs (8 miles) about 1 month ago she noted more shin pain bilaterally.  This has gotten worse over the last month.    Longest run has been 9 miles.  Training over the last week: Friday- rest, Saturday - 4 miles on tread (needed to stretch after every mile), Sunday and Monday- no running due to pain, Tuesday - 5 miles, and has not run since.    Does have pain with just walking, and hurts with stairs. Hurts initially when she started running, eventually pain subsides wit warming up, but has to stop a lot of stretch throughout her runs.  Shins continue to ache up to days after.    New shoes about 2 weeks ago, but no changes in her pain.  Has been at least 1 month since she has not has pain in her shins on a daily basis.  Sleeping is okay.    Never had shin splints in the past.  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  Believes she had a stress fracture in one of her feet.      Has not increased her nutritional needs with her increased running.    [place] milk (1 cup a day), couple slices of cheese a few times a week, and 1 cup of yogurt daily  Does not drink cows milk  Does not take any supplements    O: /76   Pulse 61   Ht 1.562 m (5' 1.5\")   Wt 54.9 kg (121 lb)   BMI 22.49 kg/m     GEN: pleasant, cooperative, NAD, well nourished  RIGHT LEG: No obvious deformity or swelling.  Exquisite tenderness palpation along the distal third of the posterior tibia.  No pain with heel jarring.  Full range of motion at knee and ankle.  No calf pain.  Normal dorsiflexion, but does improve significantly with knee flexion.  No tenderness to palpation over the Achilles tendon.  Pain with single leg hop.  LEFT LEG:  No obvious deformity or swelling.  Exquisite tenderness palpation along the distal third of the posterior tibia.  No pain with heel jarring.  Full range of motion at knee and ankle.  No calf pain.  Normal dorsiflexion, but does improve " significantly with knee flexion.  No tenderness to palpation over the Achilles tendon.  Pain with single leg hop.    A/P:  Rola Nettles is a 19-year-old female who has been training for half marathon, and has subsequently developed shin splints consistent with likely a bony stress injury in her bilateral tibias.  She has not been increasing her nutrition, and with a quick discussion about her diet it does not sound as though she is having adequate amounts of calcium.  X-rays were obtained and interpreted today did not demonstrate a fracture line, or other obvious injury.  Discussed these findings with the patient.    -Recommended discontinuing any running or pounding activities for at least the next 3 weeks as she has daily pain with walking and using stairs.  Discussed that she will likely not be able to participate in her half marathon.  She verbalized understanding, and does not want any significant injury, so is willing to stop running.  Discussed that she could bike or swim as an alternative for cardio exercise.  -Discussed the use of compression stockings if that were to be helpful with some of her pain in her shins.  Talked about walking boots or a tall Aircast, but patient declined these today.  -Discussed adequate calcium intake in her diet, recommending at least 3-4 servings of calcium a day.  Discussed trying to increase Jaelyn milk, cheese or yogurt.  Discussed if she were to have difficulties getting approximately 1200 mg of calcium a day she should use a supplement.  Discussed it is more important to get calcium from her diet.  If she were to take a supplement, she should take this in divided doses to help with absorption.  -Discussed avoidance of NSAIDs at this time to avoid any slowing of her healing time.  Recommended Tylenol for pain control if needed  -We will have her follow-up in 3 weeks in the sports medicine clinic  -Did discuss MRIs to have a definitive grading of her bone stress injury.   Patient would like to talk to her parents about this, and see how the next couple weeks ago with avoiding any pounding or running activities.  If she follows up and continues to have pain, would recommend obtaining MRI to evaluate    Patient seen and discussed with Dr. Dickson.    Sunita Barton DO  Primary Care Sports Medicine Fellow    Attending Note:   I have personally examined this patient and have reviewed the clinical presentation and progress note with the fellow. I agree with the treatment plan as outlined. The plan was formulated with the fellow on the day of the patient's visit. I have reviewed all imaging with the fellow and agree with the findings in the documentation.   Asia Dickson MD, CAQ, CCD  [facility]  Sports Medicine and Bone Health

## 2019-08-10 NOTE — PROGRESS NOTES
Attending Note:   I have personally examined this patient and have reviewed the clinical presentation and progress note with the fellow. I agree with the treatment plan as outlined. The plan was formulated with the fellow on the day of the patient's visit. I have reviewed all imaging with the fellow and agree with the findings in the documentation.     Asia Dickson MD, CAQ, CCD  [facility]  Sports Medicine and Bone Health

## 2019-10-02 ENCOUNTER — OFFICE VISIT (OUTPATIENT)
Dept: INTERNAL MEDICINE | Facility: CLINIC | Age: 19
End: 2019-10-02
Payer: COMMERCIAL

## 2019-10-02 VITALS
HEART RATE: 62 BPM | DIASTOLIC BLOOD PRESSURE: 58 MMHG | WEIGHT: 120 LBS | SYSTOLIC BLOOD PRESSURE: 96 MMHG | TEMPERATURE: 97.5 F | RESPIRATION RATE: 22 BRPM | OXYGEN SATURATION: 98 % | BODY MASS INDEX: 22.08 KG/M2 | HEIGHT: 62 IN

## 2019-10-02 DIAGNOSIS — Z11.4 ENCOUNTER FOR SCREENING FOR HIV: ICD-10-CM

## 2019-10-02 DIAGNOSIS — Z23 NEED FOR PROPHYLACTIC VACCINATION AND INOCULATION AGAINST INFLUENZA: ICD-10-CM

## 2019-10-02 DIAGNOSIS — Z00.01 ENCOUNTER FOR GENERAL ADULT MEDICAL EXAMINATION WITH ABNORMAL FINDINGS: Primary | ICD-10-CM

## 2019-10-02 DIAGNOSIS — Z12.4 SCREENING FOR MALIGNANT NEOPLASM OF CERVIX: ICD-10-CM

## 2019-10-02 DIAGNOSIS — Z30.41 ENCOUNTER FOR SURVEILLANCE OF CONTRACEPTIVE PILLS: ICD-10-CM

## 2019-10-02 PROCEDURE — 87591 N.GONORRHOEAE DNA AMP PROB: CPT | Performed by: NURSE PRACTITIONER

## 2019-10-02 PROCEDURE — 99395 PREV VISIT EST AGE 18-39: CPT | Mod: 25 | Performed by: NURSE PRACTITIONER

## 2019-10-02 PROCEDURE — 87491 CHLMYD TRACH DNA AMP PROBE: CPT | Performed by: NURSE PRACTITIONER

## 2019-10-02 PROCEDURE — 90471 IMMUNIZATION ADMIN: CPT | Performed by: NURSE PRACTITIONER

## 2019-10-02 PROCEDURE — G0145 SCR C/V CYTO,THINLAYER,RESCR: HCPCS | Performed by: NURSE PRACTITIONER

## 2019-10-02 PROCEDURE — 90686 IIV4 VACC NO PRSV 0.5 ML IM: CPT | Performed by: NURSE PRACTITIONER

## 2019-10-02 RX ORDER — LEVONORGESTREL/ETHIN.ESTRADIOL 0.1-0.02MG
1 TABLET ORAL DAILY
Qty: 84 TABLET | Refills: 3 | Status: SHIPPED | OUTPATIENT
Start: 2019-10-02 | End: 2020-08-26

## 2019-10-02 ASSESSMENT — ENCOUNTER SYMPTOMS
CONSTIPATION: 0
FREQUENCY: 0
NERVOUS/ANXIOUS: 0
DIARRHEA: 0
SHORTNESS OF BREATH: 0
PARESTHESIAS: 0
HEADACHES: 0
PALPITATIONS: 0
CHILLS: 0
DIZZINESS: 0
JOINT SWELLING: 0
MYALGIAS: 0
ABDOMINAL PAIN: 0
HEARTBURN: 0
BREAST MASS: 0
NAUSEA: 0
FEVER: 0
SORE THROAT: 1
ARTHRALGIAS: 0
DYSURIA: 0
HEMATOCHEZIA: 0
EYE PAIN: 0
WEAKNESS: 0
COUGH: 1
HEMATURIA: 0

## 2019-10-02 ASSESSMENT — MIFFLIN-ST. JEOR: SCORE: 1254.63

## 2019-10-02 NOTE — NURSING NOTE
"Chief Complaint   Patient presents with     Physical     wants pap and flu shot     initial BP 96/58   Pulse 62   Temp 97.5  F (36.4  C) (Oral)   Resp 22   Ht 1.562 m (5' 1.5\")   Wt 54.4 kg (120 lb)   LMP 09/06/2019 (Exact Date)   SpO2 98%   Breastfeeding? No   BMI 22.31 kg/m   Estimated body mass index is 22.31 kg/m  as calculated from the following:    Height as of this encounter: 1.562 m (5' 1.5\").    Weight as of this encounter: 54.4 kg (120 lb)..  bp completed using cuff size regular  ANJEL CARRANZA LPN  "

## 2019-10-02 NOTE — PROGRESS NOTES
SUBJECTIVE:   CC: Rola Nettles is an 19 year old woman who presents for preventive health visit.     Healthy Habits:     Getting at least 3 servings of Calcium per day:  NO    Bi-annual eye exam:  Yes    Dental care twice a year:  Yes    Sleep apnea or symptoms of sleep apnea:  Daytime drowsiness    Diet:  Regular (no restrictions) and Breakfast skipped    Frequency of exercise:  2-3 days/week    Duration of exercise:  30-45 minutes    Taking medications regularly:  Yes    Medication side effects:  None    PHQ-2 Total Score: 1    Additional concerns today:  No              Today's PHQ-2 Score:   PHQ-2 ( 1999 Pfizer) 10/2/2019   Q1: Little interest or pleasure in doing things 0   Q2: Feeling down, depressed or hopeless 1   PHQ-2 Score 1   Q1: Little interest or pleasure in doing things Not at all   Q2: Feeling down, depressed or hopeless Several days   PHQ-2 Score 1       Abuse: Current or Past(Physical, Sexual or Emotional)- No  Do you feel safe in your environment? Yes    Social History     Tobacco Use     Smoking status: Never Smoker     Smokeless tobacco: Never Used   Substance Use Topics     Alcohol use: Yes     Comment: Occasionally         Alcohol Use 10/2/2019   Prescreen: >3 drinks/day or >7 drinks/week? No   Prescreen: >3 drinks/day or >7 drinks/week? -       Reviewed orders with patient.  Reviewed health maintenance and updated orders accordingly - Yes          Pertinent mammograms are reviewed under the imaging tab.  History of abnormal Pap smear:      Reviewed and updated as needed this visit by clinical staff  Tobacco  Allergies  Meds  Problems  Med Hx  Surg Hx  Fam Hx         Reviewed and updated as needed this visit by Provider  Tobacco  Allergies  Meds  Problems  Med Hx  Surg Hx  Fam Hx            Review of Systems   Constitutional: Negative for chills and fever.   HENT: Positive for congestion and sore throat. Negative for ear pain and hearing loss.    Eyes: Negative for pain and  "visual disturbance.   Respiratory: Positive for cough. Negative for shortness of breath.    Cardiovascular: Negative for chest pain, palpitations and peripheral edema.   Gastrointestinal: Negative for abdominal pain, constipation, diarrhea, heartburn, hematochezia and nausea.   Breasts:  Negative for breast mass and discharge.   Genitourinary: Negative for dysuria, frequency, genital sores, hematuria, pelvic pain, urgency, vaginal bleeding and vaginal discharge.   Musculoskeletal: Negative for arthralgias, joint swelling and myalgias.   Skin: Negative for rash.   Neurological: Negative for dizziness, weakness, headaches and paresthesias.   Psychiatric/Behavioral: Negative for mood changes. The patient is not nervous/anxious.      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 No   BMI 22.31 kg/m    Physical Exam  GENERAL: alert and no distress  EYES: Eyes grossly normal to inspection, and conjunctivae and sclerae normal  HENT: ear canals and TM's normal, nose and mouth without ulcers or lesions  NECK: no adenopathy, no asymmetry, masses, or scars and thyroid normal to palpation  RESP: lungs clear to auscultation - no rales, rhonchi or wheezes  BREAST: normal without masses, tenderness or nipple discharge and no palpable axillary masses or adenopathy  CV: regular rate and rhythm, normal S1 S2, no S3 or S4, no murmur, click or rub, no peripheral edema and peripheral pulses strong  ABDOMEN: soft, nontender, no hepatosplenomegaly, no masses and bowel sounds normal   (female): normal female external genitalia, normal urethral meatus, vaginal mucosa pink, moist, well rugated, and normal cervix/adnexa/uterus without masses or discharge  MS: no gross musculoskeletal defects noted, no " "edema  SKIN: no suspicious lesions or rashes  NEURO: Normal strength and tone, mentation intact and speech normal  PSYCH: mentation appears normal, affect normal/bright    Diagnostic Test Results:  Labs reviewed in Epic  PAP    ASSESSMENT/PLAN:   1. Encounter for general adult medical examination with abnormal findings    - NEISSERIA GONORRHOEA PCR  - CHLAMYDIA TRACHOMATIS PCR  - Pap imaged thin layer screen only - recommended age 21 - 24 years  - Lipid panel reflex to direct LDL Fasting  - Comprehensive metabolic panel  - TSH with free T4 reflex  - CBC with platelets and differential  - HIV Antigen Antibody Combo    2. Encounter for surveillance of contraceptive pills  Tolerating pills   - levonorgestrel-ethinyl estradiol (AVIANE/ALESSE/LESSINA) 0.1-20 MG-MCG tablet; Take 1 tablet by mouth daily  Dispense: 84 tablet; Refill: 3    3. Need for prophylactic vaccination and inoculation against influenza    - INFLUENZA VACCINE IM > 6 MONTHS VALENT IIV4 [34416]  - Vaccine Administration, Initial [61415]    4. Screening for malignant neoplasm of cervix  First pap   - Pap imaged thin layer screen only - recommended age 21 - 24 years    5. Encounter for screening for HIV    - HIV Antigen Antibody Combo    COUNSELING:  Reviewed preventive health counseling, as reflected in patient instructions       Regular exercise       Healthy diet/nutrition       Contraception       Osteoporosis Prevention/Bone Health       Safe sex practices/STD prevention    Estimated body mass index is 22.31 kg/m  as calculated from the following:    Height as of this encounter: 1.562 m (5' 1.5\").    Weight as of this encounter: 54.4 kg (120 lb).         reports that she has never smoked. She has never used smokeless tobacco.      Counseling Resources:  ATP IV Guidelines  Pooled Cohorts Equation Calculator  Breast Cancer Risk Calculator  FRAX Risk Assessment  ICSI Preventive Guidelines  Dietary Guidelines for Americans, 2010  USDA's MyPlate  ASA " Prophylaxis  Lung CA Screening    ANTONY Sanchez CNP  [facility]

## 2019-10-03 LAB
C TRACH DNA SPEC QL NAA+PROBE: NEGATIVE
N GONORRHOEA DNA SPEC QL NAA+PROBE: NEGATIVE
SPECIMEN SOURCE: NORMAL
SPECIMEN SOURCE: NORMAL

## 2019-10-07 LAB
COPATH REPORT: NORMAL
PAP: NORMAL

## 2020-01-14 ENCOUNTER — DOCUMENTATION ONLY (OUTPATIENT)
Dept: LAB | Facility: CLINIC | Age: 20
End: 2020-01-14

## 2020-01-14 DIAGNOSIS — Z11.3 SCREEN FOR STD (SEXUALLY TRANSMITTED DISEASE): Primary | ICD-10-CM

## 2020-01-14 NOTE — PROGRESS NOTES
Patient has a lab only appointment on 1.18.2020. Anatoly notes states in addition to blood tests, she would like to be tested for Gonorrhea & Chlamydia PCR. Please order test with pt request. Thank you.

## 2020-01-19 DIAGNOSIS — Z00.01 ENCOUNTER FOR GENERAL ADULT MEDICAL EXAMINATION WITH ABNORMAL FINDINGS: ICD-10-CM

## 2020-01-19 DIAGNOSIS — Z11.4 ENCOUNTER FOR SCREENING FOR HIV: ICD-10-CM

## 2020-01-19 DIAGNOSIS — Z11.3 SCREEN FOR STD (SEXUALLY TRANSMITTED DISEASE): ICD-10-CM

## 2020-01-19 LAB
ALBUMIN SERPL-MCNC: 3.8 G/DL (ref 3.4–5)
ALP SERPL-CCNC: 43 U/L (ref 40–150)
ALT SERPL W P-5'-P-CCNC: 13 U/L (ref 0–50)
ANION GAP SERPL CALCULATED.3IONS-SCNC: 5 MMOL/L (ref 3–14)
AST SERPL W P-5'-P-CCNC: 14 U/L (ref 0–45)
BASOPHILS # BLD AUTO: 0 10E9/L (ref 0–0.2)
BASOPHILS NFR BLD AUTO: 0.3 %
BILIRUB SERPL-MCNC: 0.8 MG/DL (ref 0.2–1.3)
BUN SERPL-MCNC: 12 MG/DL (ref 7–30)
CALCIUM SERPL-MCNC: 8.7 MG/DL (ref 8.5–10.1)
CHLORIDE SERPL-SCNC: 106 MMOL/L (ref 94–109)
CHOLEST SERPL-MCNC: 202 MG/DL
CO2 SERPL-SCNC: 26 MMOL/L (ref 20–32)
CREAT SERPL-MCNC: 0.63 MG/DL (ref 0.52–1.04)
DIFFERENTIAL METHOD BLD: NORMAL
EOSINOPHIL # BLD AUTO: 0.1 10E9/L (ref 0–0.7)
EOSINOPHIL NFR BLD AUTO: 0.9 %
ERYTHROCYTE [DISTWIDTH] IN BLOOD BY AUTOMATED COUNT: 12.3 % (ref 10–15)
GFR SERPL CREATININE-BSD FRML MDRD: >90 ML/MIN/{1.73_M2}
GLUCOSE SERPL-MCNC: 87 MG/DL (ref 70–99)
HCT VFR BLD AUTO: 38.2 % (ref 35–47)
HDLC SERPL-MCNC: 69 MG/DL
HGB BLD-MCNC: 12.7 G/DL (ref 11.7–15.7)
LDLC SERPL CALC-MCNC: 116 MG/DL
LYMPHOCYTES # BLD AUTO: 3.6 10E9/L (ref 0.8–5.3)
LYMPHOCYTES NFR BLD AUTO: 54 %
MCH RBC QN AUTO: 29.6 PG (ref 26.5–33)
MCHC RBC AUTO-ENTMCNC: 33.2 G/DL (ref 31.5–36.5)
MCV RBC AUTO: 89 FL (ref 78–100)
MONOCYTES # BLD AUTO: 0.4 10E9/L (ref 0–1.3)
MONOCYTES NFR BLD AUTO: 5.7 %
NEUTROPHILS # BLD AUTO: 2.6 10E9/L (ref 1.6–8.3)
NEUTROPHILS NFR BLD AUTO: 39.1 %
NONHDLC SERPL-MCNC: 133 MG/DL
PLATELET # BLD AUTO: 225 10E9/L (ref 150–450)
POTASSIUM SERPL-SCNC: 3.7 MMOL/L (ref 3.4–5.3)
PROT SERPL-MCNC: 7.3 G/DL (ref 6.8–8.8)
RBC # BLD AUTO: 4.29 10E12/L (ref 3.8–5.2)
SODIUM SERPL-SCNC: 137 MMOL/L (ref 133–144)
TRIGL SERPL-MCNC: 84 MG/DL
TSH SERPL DL<=0.005 MIU/L-ACNC: 3.32 MU/L (ref 0.4–4)
WBC # BLD AUTO: 6.7 10E9/L (ref 4–11)

## 2020-01-19 PROCEDURE — 87591 N.GONORRHOEAE DNA AMP PROB: CPT | Performed by: NURSE PRACTITIONER

## 2020-01-19 PROCEDURE — 87491 CHLMYD TRACH DNA AMP PROBE: CPT | Performed by: NURSE PRACTITIONER

## 2020-01-19 PROCEDURE — 36415 COLL VENOUS BLD VENIPUNCTURE: CPT | Performed by: NURSE PRACTITIONER

## 2020-01-19 PROCEDURE — 80061 LIPID PANEL: CPT | Performed by: NURSE PRACTITIONER

## 2020-01-19 PROCEDURE — 87389 HIV-1 AG W/HIV-1&-2 AB AG IA: CPT | Performed by: NURSE PRACTITIONER

## 2020-01-19 PROCEDURE — 80050 GENERAL HEALTH PANEL: CPT | Performed by: NURSE PRACTITIONER

## 2020-01-20 LAB
HIV 1+2 AB+HIV1 P24 AG SERPL QL IA: NONREACTIVE
N GONORRHOEA DNA SPEC QL NAA+PROBE: NEGATIVE
SPECIMEN SOURCE: NORMAL

## 2020-01-21 DIAGNOSIS — A74.9 CHLAMYDIA INFECTION: Primary | ICD-10-CM

## 2020-01-21 LAB
C TRACH DNA SPEC QL NAA+PROBE: POSITIVE
SPECIMEN SOURCE: ABNORMAL

## 2020-01-21 RX ORDER — AZITHROMYCIN 500 MG/1
1000 TABLET, FILM COATED ORAL DAILY
Qty: 2 TABLET | Refills: 0 | Status: SHIPPED | OUTPATIENT
Start: 2020-01-21 | End: 2020-01-22

## 2020-08-25 DIAGNOSIS — Z30.41 ENCOUNTER FOR SURVEILLANCE OF CONTRACEPTIVE PILLS: ICD-10-CM

## 2020-08-26 RX ORDER — LEVONORGESTREL AND ETHINYL ESTRADIOL 0.1-0.02MG
KIT ORAL
Qty: 84 TABLET | Refills: 3 | Status: SHIPPED | OUTPATIENT
Start: 2020-08-26 | End: 2021-08-12

## 2020-11-13 ENCOUNTER — TELEPHONE (OUTPATIENT)
Dept: INTERNAL MEDICINE | Facility: CLINIC | Age: 20
End: 2020-11-13

## 2020-11-13 NOTE — TELEPHONE ENCOUNTER
Patient scheduled for appointment with 11/17 with Ericka, per appointment note, patient would like to discuss IUD.    Attempted to contact patient. Left voice message to call back.     If patient only wants appointment to discuss IUD, would recommend she reschedule appointment with OB-GYN instead. If she has other concerns for Ericka, okay to keep appointment as scheduled and schedule additional appointment with OB-GYN for IUD.

## 2020-11-16 ENCOUNTER — HEALTH MAINTENANCE LETTER (OUTPATIENT)
Age: 20
End: 2020-11-16

## 2020-11-17 ENCOUNTER — OFFICE VISIT (OUTPATIENT)
Dept: INTERNAL MEDICINE | Facility: CLINIC | Age: 20
End: 2020-11-17
Payer: COMMERCIAL

## 2020-11-17 VITALS
HEART RATE: 69 BPM | SYSTOLIC BLOOD PRESSURE: 102 MMHG | BODY MASS INDEX: 23 KG/M2 | TEMPERATURE: 98.4 F | OXYGEN SATURATION: 99 % | RESPIRATION RATE: 16 BRPM | WEIGHT: 125 LBS | DIASTOLIC BLOOD PRESSURE: 60 MMHG | HEIGHT: 62 IN

## 2020-11-17 DIAGNOSIS — Z11.59 NEED FOR HEPATITIS C SCREENING TEST: ICD-10-CM

## 2020-11-17 DIAGNOSIS — Z00.00 LABORATORY EXAMINATION ORDERED AS PART OF A ROUTINE GENERAL MEDICAL EXAMINATION: ICD-10-CM

## 2020-11-17 DIAGNOSIS — Z00.00 ROUTINE GENERAL MEDICAL EXAMINATION AT A HEALTH CARE FACILITY: Primary | ICD-10-CM

## 2020-11-17 DIAGNOSIS — Z23 NEED FOR PROPHYLACTIC VACCINATION AND INOCULATION AGAINST INFLUENZA: ICD-10-CM

## 2020-11-17 DIAGNOSIS — Z11.3 SCREEN FOR STD (SEXUALLY TRANSMITTED DISEASE): ICD-10-CM

## 2020-11-17 LAB
BASOPHILS # BLD AUTO: 0 10E9/L (ref 0–0.2)
BASOPHILS NFR BLD AUTO: 0.3 %
DIFFERENTIAL METHOD BLD: NORMAL
EOSINOPHIL # BLD AUTO: 0 10E9/L (ref 0–0.7)
EOSINOPHIL NFR BLD AUTO: 0.5 %
ERYTHROCYTE [DISTWIDTH] IN BLOOD BY AUTOMATED COUNT: 12 % (ref 10–15)
HCT VFR BLD AUTO: 39.6 % (ref 35–47)
HGB BLD-MCNC: 13 G/DL (ref 11.7–15.7)
LYMPHOCYTES # BLD AUTO: 3.2 10E9/L (ref 0.8–5.3)
LYMPHOCYTES NFR BLD AUTO: 40.3 %
MCH RBC QN AUTO: 29.1 PG (ref 26.5–33)
MCHC RBC AUTO-ENTMCNC: 32.8 G/DL (ref 31.5–36.5)
MCV RBC AUTO: 89 FL (ref 78–100)
MONOCYTES # BLD AUTO: 0.4 10E9/L (ref 0–1.3)
MONOCYTES NFR BLD AUTO: 4.4 %
NEUTROPHILS # BLD AUTO: 4.3 10E9/L (ref 1.6–8.3)
NEUTROPHILS NFR BLD AUTO: 54.5 %
PLATELET # BLD AUTO: 215 10E9/L (ref 150–450)
RBC # BLD AUTO: 4.47 10E12/L (ref 3.8–5.2)
WBC # BLD AUTO: 7.9 10E9/L (ref 4–11)

## 2020-11-17 PROCEDURE — 36415 COLL VENOUS BLD VENIPUNCTURE: CPT | Performed by: NURSE PRACTITIONER

## 2020-11-17 PROCEDURE — 87491 CHLMYD TRACH DNA AMP PROBE: CPT | Performed by: NURSE PRACTITIONER

## 2020-11-17 PROCEDURE — 90471 IMMUNIZATION ADMIN: CPT | Performed by: NURSE PRACTITIONER

## 2020-11-17 PROCEDURE — 86803 HEPATITIS C AB TEST: CPT | Performed by: NURSE PRACTITIONER

## 2020-11-17 PROCEDURE — 80050 GENERAL HEALTH PANEL: CPT | Performed by: NURSE PRACTITIONER

## 2020-11-17 PROCEDURE — 90686 IIV4 VACC NO PRSV 0.5 ML IM: CPT | Performed by: NURSE PRACTITIONER

## 2020-11-17 PROCEDURE — 99395 PREV VISIT EST AGE 18-39: CPT | Mod: 25 | Performed by: NURSE PRACTITIONER

## 2020-11-17 PROCEDURE — 80061 LIPID PANEL: CPT | Performed by: NURSE PRACTITIONER

## 2020-11-17 PROCEDURE — 87591 N.GONORRHOEAE DNA AMP PROB: CPT | Performed by: NURSE PRACTITIONER

## 2020-11-17 ASSESSMENT — ENCOUNTER SYMPTOMS
MYALGIAS: 0
COUGH: 0
ARTHRALGIAS: 0
EYE PAIN: 0
FREQUENCY: 0
HEMATURIA: 0
ABDOMINAL PAIN: 0
SHORTNESS OF BREATH: 0
JOINT SWELLING: 0
DIARRHEA: 0
NERVOUS/ANXIOUS: 0
CONSTIPATION: 0
PALPITATIONS: 0
PARESTHESIAS: 0
SORE THROAT: 0
DYSURIA: 0
CHILLS: 0
WEAKNESS: 0
FEVER: 0
HEARTBURN: 0
BREAST MASS: 0
HEADACHES: 0
NAUSEA: 0
DIZZINESS: 0
HEMATOCHEZIA: 0

## 2020-11-17 ASSESSMENT — MIFFLIN-ST. JEOR: SCORE: 1272.31

## 2020-11-17 NOTE — NURSING NOTE
"Chief Complaint   Patient presents with     Physical     fasting     initial /60   Pulse 69   Temp 98.4  F (36.9  C) (Oral)   Resp 16   Ht 1.562 m (5' 1.5\")   Wt 56.7 kg (125 lb)   LMP 10/31/2020 (Exact Date)   SpO2 99%   Breastfeeding No   BMI 23.24 kg/m   Estimated body mass index is 23.24 kg/m  as calculated from the following:    Height as of this encounter: 1.562 m (5' 1.5\").    Weight as of this encounter: 56.7 kg (125 lb)..  bp completed using cuff size regular  ANJEL CARRANZA LPN  "
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
## 2020-11-18 LAB
ALBUMIN SERPL-MCNC: 4.1 G/DL (ref 3.4–5)
ALP SERPL-CCNC: 50 U/L (ref 40–150)
ALT SERPL W P-5'-P-CCNC: 13 U/L (ref 0–50)
ANION GAP SERPL CALCULATED.3IONS-SCNC: 7 MMOL/L (ref 3–14)
AST SERPL W P-5'-P-CCNC: 14 U/L (ref 0–45)
BILIRUB SERPL-MCNC: 0.5 MG/DL (ref 0.2–1.3)
BUN SERPL-MCNC: 12 MG/DL (ref 7–30)
C TRACH DNA SPEC QL NAA+PROBE: NEGATIVE
CALCIUM SERPL-MCNC: 8.9 MG/DL (ref 8.5–10.1)
CHLORIDE SERPL-SCNC: 106 MMOL/L (ref 94–109)
CHOLEST SERPL-MCNC: 209 MG/DL
CO2 SERPL-SCNC: 25 MMOL/L (ref 20–32)
CREAT SERPL-MCNC: 0.62 MG/DL (ref 0.52–1.04)
GFR SERPL CREATININE-BSD FRML MDRD: >90 ML/MIN/{1.73_M2}
GLUCOSE SERPL-MCNC: 81 MG/DL (ref 70–99)
HCV AB SERPL QL IA: NONREACTIVE
HDLC SERPL-MCNC: 52 MG/DL
LDLC SERPL CALC-MCNC: 122 MG/DL
N GONORRHOEA DNA SPEC QL NAA+PROBE: NEGATIVE
NONHDLC SERPL-MCNC: 157 MG/DL
POTASSIUM SERPL-SCNC: 3.9 MMOL/L (ref 3.4–5.3)
PROT SERPL-MCNC: 7.5 G/DL (ref 6.8–8.8)
SODIUM SERPL-SCNC: 138 MMOL/L (ref 133–144)
SPECIMEN SOURCE: NORMAL
SPECIMEN SOURCE: NORMAL
TRIGL SERPL-MCNC: 177 MG/DL
TSH SERPL DL<=0.005 MIU/L-ACNC: 1.6 MU/L (ref 0.4–4)

## 2021-08-12 DIAGNOSIS — Z30.41 ENCOUNTER FOR SURVEILLANCE OF CONTRACEPTIVE PILLS: ICD-10-CM

## 2021-08-12 RX ORDER — LEVONORGESTREL AND ETHINYL ESTRADIOL 0.1-0.02MG
KIT ORAL
Qty: 84 TABLET | Refills: 0 | Status: SHIPPED | OUTPATIENT
Start: 2021-08-12

## 2021-08-12 NOTE — TELEPHONE ENCOUNTER
Medication is being filled for 1 time refill only due to:  patient will be due for an appointment in November     Please call patient or mail a letter

## 2021-08-12 NOTE — TELEPHONE ENCOUNTER
"Requested Prescriptions   Pending Prescriptions Disp Refills     SRONYX 0.1-20 MG-MCG tablet [Pharmacy Med Name: SRONYX 0.10-0.02 MG TABLET] 28 tablet      Sig: TAKE 1 TABLET BY MOUTH EVERY DAY       Contraceptives Protocol Passed - 8/12/2021  2:33 AM        Passed - Patient is not a current smoker if age is 35 or older        Passed - Recent (12 mo) or future (30 days) visit within the authorizing provider's specialty     Patient has had an office visit with the authorizing provider or a provider within the authorizing providers department within the previous 12 mos or has a future within next 30 days. See \"Patient Info\" tab in inbasket, or \"Choose Columns\" in Meds & Orders section of the refill encounter.              Passed - Medication is active on med list        Passed - No active pregnancy on record        Passed - No positive pregnancy test in past 12 months             "

## 2021-08-12 NOTE — LETTER
[facility]  [address]  [facility] [address]  Phone: [phone]        August 12, 2021      Rola Nettles                                                                                                                                [address]  [facility] [address]            Dear Ms. Nettles,     We recently provided you with a medication refill.  Many medications require routine lab work and follow-up with your Doctor.  We refilled your medication for a 90 day supply and noticed that you will be due for an appointment in November, right around the time you will need another refill.  Please call [phone] to schedule your physical with Ericka.        Thank you,      [facility]

## 2021-09-18 ENCOUNTER — HEALTH MAINTENANCE LETTER (OUTPATIENT)
Age: 21
End: 2021-09-18

## 2022-01-08 ENCOUNTER — HEALTH MAINTENANCE LETTER (OUTPATIENT)
Age: 22
End: 2022-01-08

## 2022-02-15 DIAGNOSIS — Z30.41 ENCOUNTER FOR SURVEILLANCE OF CONTRACEPTIVE PILLS: ICD-10-CM

## 2022-02-16 NOTE — TELEPHONE ENCOUNTER
Routing refill request to provider for review/approval because:  Marcelle given x1 and patient did not follow up, please advise    Symone Mora RN

## 2022-02-17 RX ORDER — LEVONORGESTREL AND ETHINYL ESTRADIOL 0.1-0.02MG
KIT ORAL
Qty: 84 TABLET | Refills: 0 | OUTPATIENT
Start: 2022-02-17

## 2022-11-20 ENCOUNTER — HEALTH MAINTENANCE LETTER (OUTPATIENT)
Age: 22
End: 2022-11-20

## 2023-04-15 ENCOUNTER — HEALTH MAINTENANCE LETTER (OUTPATIENT)
Age: 23
End: 2023-04-15

## 2024-06-16 ENCOUNTER — HEALTH MAINTENANCE LETTER (OUTPATIENT)
Age: 24
End: 2024-06-16